# Patient Record
Sex: MALE | Race: WHITE | Employment: PART TIME | URBAN - METROPOLITAN AREA
[De-identification: names, ages, dates, MRNs, and addresses within clinical notes are randomized per-mention and may not be internally consistent; named-entity substitution may affect disease eponyms.]

---

## 2017-02-22 ENCOUNTER — APPOINTMENT (OUTPATIENT)
Dept: GENERAL RADIOLOGY | Age: 32
End: 2017-02-22
Attending: EMERGENCY MEDICINE
Payer: SUBSIDIZED

## 2017-02-22 ENCOUNTER — HOSPITAL ENCOUNTER (EMERGENCY)
Age: 32
Discharge: HOME | End: 2017-02-22
Attending: EMERGENCY MEDICINE
Payer: SUBSIDIZED

## 2017-02-22 VITALS
HEART RATE: 93 BPM | HEIGHT: 72 IN | DIASTOLIC BLOOD PRESSURE: 81 MMHG | TEMPERATURE: 98.5 F | RESPIRATION RATE: 18 BRPM | WEIGHT: 168 LBS | OXYGEN SATURATION: 98 % | BODY MASS INDEX: 22.75 KG/M2 | SYSTOLIC BLOOD PRESSURE: 127 MMHG

## 2017-02-22 DIAGNOSIS — S62.397A CLOSED NONDISPLACED FRACTURE OF OTHER PART OF FIFTH METACARPAL BONE OF LEFT HAND, INITIAL ENCOUNTER: Primary | ICD-10-CM

## 2017-02-22 PROCEDURE — 75810000053 HC SPLINT APPLICATION

## 2017-02-22 PROCEDURE — 74011250637 HC RX REV CODE- 250/637: Performed by: EMERGENCY MEDICINE

## 2017-02-22 PROCEDURE — 99283 EMERGENCY DEPT VISIT LOW MDM: CPT

## 2017-02-22 PROCEDURE — 73130 X-RAY EXAM OF HAND: CPT

## 2017-02-22 RX ORDER — NAPROXEN 500 MG/1
500 TABLET ORAL
Qty: 20 TAB | Refills: 0 | Status: SHIPPED | OUTPATIENT
Start: 2017-02-22 | End: 2017-05-07

## 2017-02-22 RX ORDER — TRAMADOL HYDROCHLORIDE 50 MG/1
50 TABLET ORAL
Qty: 12 TAB | Refills: 0 | Status: SHIPPED | OUTPATIENT
Start: 2017-02-22 | End: 2017-05-07

## 2017-02-22 RX ORDER — IBUPROFEN 600 MG/1
600 TABLET ORAL
Status: COMPLETED | OUTPATIENT
Start: 2017-02-22 | End: 2017-02-22

## 2017-02-22 RX ADMIN — IBUPROFEN 600 MG: 600 TABLET, FILM COATED ORAL at 12:54

## 2017-02-22 NOTE — DISCHARGE INSTRUCTIONS
Broken Hand: Care Instructions  Your Care Instructions  A hand can break (fracture) during sports, a fall, or other accidents. The break may happen when your hand twists, is hit, or is used to protect you in a fall. Fractures can range from a small, hairline crack, to a bone or bones broken into two or more pieces. Your treatment depends on how bad the break is. Your doctor may have put your hand in a brace, splint, or cast to allow it to heal or to keep it stable until you see another doctor. It may take weeks or months for your hand to heal. You can help it heal with some care at home. You heal best when you take good care of yourself. Eat a variety of healthy foods, and don't smoke. Follow-up care is a key part of your treatment and safety. Be sure to make and go to all appointments, and call your doctor if you are having problems. It's also a good idea to know your test results and keep a list of the medicines you take. How can you care for yourself at home? · Put ice or a cold pack on your hand for 10 to 20 minutes at a time. Try to do this every 1 to 2 hours for the next 3 days (when you are awake). Put a thin cloth between the ice and your cast or splint. Keep your cast or splint dry. · Follow the cast care instructions your doctor gives you. If you have a splint, do not take it off unless your doctor tells you to. · Be safe with medicines. Take pain medicines exactly as directed. ¨ If the doctor gave you a prescription medicine for pain, take it as prescribed. ¨ If you are not taking a prescription pain medicine, ask your doctor if you can take an over-the-counter medicine. · Prop up your hand on pillows when you sit or lie down in the first few days after the injury. Keep your hand higher than the level of your heart. This will help reduce swelling. · Follow instructions for exercises to keep your arm strong.   · Wiggle your uninjured fingers often to reduce swelling and stiffness, but do not use that hand to grasp or carry anything. When should you call for help? Call your doctor now or seek immediate medical care if:  · You have severe pain or increased pain. · Your cast or splint feels too tight. · You cannot move your fingers. · You have tingling, weakness, or numbness in your hand and fingers. · Your hand or fingers are cool or pale or change color. · You have a lot of swelling near your cast or splint. · The skin under your cast or splint is burning or stinging. Watch closely for changes in your health, and be sure to contact your doctor if:  · You do not get better as expected. Where can you learn more? Go to http://alf-nasim.info/. Enter [phone] [phone] in the search box to learn more about \"Broken Hand: Care Instructions. \"  Current as of: May 23, 2016  Content Version: 11.1  © 8512-0110 [employer]. Care instructions adapted under license by [employer] (which disclaims liability or warranty for this information). If you have questions about a medical condition or this instruction, always ask your healthcare professional. [address] any warranty or liability for your use of this information.

## 2017-02-22 NOTE — LETTER
[facility] EMERGENCY DEPT 
[address] [facility] [address] [id number] 
[phone] Work/School Note Date: 2/22/2017 To Whom It May concern: 
 
Makenna Kelly was seen and treated today in the emergency room by the following provider(s): 
Attending Provider: Marquita Acosta MD 
Physician Assistant: Valerie Garcia PA-C. Makenna Kelly may return to work on 2/22/2017. Sincerely, Valerie Garcia PA-C
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
## 2017-02-22 NOTE — LETTER
[address] 
[facility] EMERGENCY DEPT 
[address] [facility] [address] [address] 
[phone] Work/School Note Date: 2/22/2017 To Whom It May concern: 
 
Wojciech Ireland was seen and treated today in the emergency room by the following provider(s): 
Physician Assistant: Irineo Kessler PA-C. Wojciech Ireladn may return to work at [address] [place] [address] on 2/22/2017. Sincerely, Irineo Kessler PA-C

## 2017-05-07 ENCOUNTER — HOSPITAL ENCOUNTER (EMERGENCY)
Age: 32
Discharge: HOME | End: 2017-05-07
Attending: INTERNAL MEDICINE | Admitting: INTERNAL MEDICINE
Payer: SUBSIDIZED

## 2017-05-07 VITALS
HEART RATE: 75 BPM | BODY MASS INDEX: 22.35 KG/M2 | TEMPERATURE: 98.1 F | SYSTOLIC BLOOD PRESSURE: 118 MMHG | HEIGHT: 72 IN | RESPIRATION RATE: 19 BRPM | DIASTOLIC BLOOD PRESSURE: 70 MMHG | WEIGHT: 165 LBS

## 2017-05-07 DIAGNOSIS — H72.92 RUPTURED EAR DRUM, LEFT: ICD-10-CM

## 2017-05-07 DIAGNOSIS — H61.23 BILATERAL IMPACTED CERUMEN: Primary | ICD-10-CM

## 2017-05-07 PROCEDURE — 99282 EMERGENCY DEPT VISIT SF MDM: CPT

## 2017-05-07 PROCEDURE — 76010010392 HC REMOVAL IMPACTED WAX IRRIGATION/LVG UNI

## 2017-05-07 PROCEDURE — 99283 EMERGENCY DEPT VISIT LOW MDM: CPT

## 2017-05-07 RX ORDER — AMOXICILLIN 875 MG/1
875 TABLET, FILM COATED ORAL 2 TIMES DAILY
Qty: 14 TAB | Refills: 0 | Status: SHIPPED | OUTPATIENT
Start: 2017-05-07 | End: 2017-05-14

## 2017-05-07 NOTE — ED NOTES
Patient (s)  given copy of dc instructions and 2 script(s). Patient (s)  verbalized understanding of instructions and script (s). Patient given a current medication reconciliation form and verbalized understanding of their medications. Patient (s) verbalized understanding of the importance of discussing medications with  his or her physician or clinic they will be following up with. Patient alert and oriented and in no acute distress. Patient discharged home ambulatory.   Patient declined wheelchair at discharge

## 2017-05-07 NOTE — ED NOTES
MD came in and looked at patients ear. Bleeding has stopped. Patient was given cotton balls to place in left ear to protect it from infection. Right ear lightly irrigated- little debris came out.

## 2017-05-07 NOTE — ED NOTES
Emergency Department Nursing Plan of Care       The Nursing Plan of Care is developed from the Nursing assessment and Emergency Department Attending provider initial evaluation. The plan of care may be reviewed in the ED Provider note.     The Plan of Care was developed with the following considerations:   Patient / Family readiness to learn indicated by:verbalized understanding  Persons(s) to be included in education: patient  Barriers to Learning/Limitations:No    Signed     Ethan Blanc RN    5/7/2017   9:34 AM

## 2017-05-07 NOTE — ED NOTES
I irrigated the patients left ear and multiple large pieces of white, gray and yellow matter came out. I reported it to the MD and had him come in the room and look at the debris and look in the patients ear. He felt additional irrigation was needed so I did more and then the patient reported pain and his ear was bleeding. I notified the provider.

## 2017-05-07 NOTE — ED PROVIDER NOTES
HPI Comments: Ayla tSern is a 28 y.o. Male who presents ambulatory to the ED c/o acute onset of L ear pain since earlier this morning. Pt notes that he has not been able to hear properly from his L ear. Pt notes that he was given ear medicine for a previous episode of ear pain, and he has attempted to use this medicine again without relief. Pt denies any longstanding diseases. Pt specifically denies fever, cough, congestion, nausea, vomiting, or diarrhea. Social hx: + Tobacco use (0.75 ppd), - EtOH use, - Illicit drug use    PCP: None    There are no other complaints, changes or physical findings at this time. The history is provided by the patient. No  was used. History reviewed. No pertinent past medical history. Past Surgical History:   Procedure Laterality Date    HX WRIST FRACTURE TX Right          History reviewed. No pertinent family history. Social History     Social History    Marital status: SINGLE     Spouse name: N/A    Number of children: N/A    Years of education: N/A     Occupational History    Not on file. Social History Main Topics    Smoking status: Current Every Day Smoker     Packs/day: 0.75    Smokeless tobacco: Not on file    Alcohol use No    Drug use: No    Sexual activity: Yes     Partners: Male     Other Topics Concern    Not on file     Social History Narrative         ALLERGIES: Review of patient's allergies indicates no known allergies. Review of Systems   Constitutional: Negative for chills, fatigue and fever. HENT: Positive for ear pain (left). Negative for congestion and rhinorrhea.         + L-sided hearing decreased   Eyes: Negative for visual disturbance. Respiratory: Negative for cough, shortness of breath and wheezing. Cardiovascular: Negative for chest pain and palpitations. Gastrointestinal: Negative for abdominal distention, abdominal pain, constipation, diarrhea, nausea and vomiting. Endocrine: Negative. Genitourinary: Negative for difficulty urinating and dysuria. Musculoskeletal: Negative. Skin: Negative for rash. Neurological: Negative for dizziness, weakness and light-headedness. Psychiatric/Behavioral: Negative for suicidal ideas. Vitals:    05/07/17 0926   BP: 118/70   Pulse: 75   Resp: 19   Temp: 98.1 °F (36.7 °C)   Weight: 74.8 kg (165 lb)   Height: 6' (1.829 m)            Physical Exam   Constitutional: He is oriented to person, place, and time. He appears well-developed and well-nourished. HENT:   Head: Normocephalic and atraumatic. Collection of dey old cerumen in K ear, ear canal not visible BL   Eyes: EOM are normal.   Cardiovascular: Normal rate, regular rhythm and normal heart sounds. Exam reveals no gallop and no friction rub. No murmur heard. Pulmonary/Chest: Effort normal and breath sounds normal. No respiratory distress. He has no wheezes. He has no rales. Abdominal: Soft. Bowel sounds are normal. He exhibits no distension. There is no tenderness. There is no rebound and no guarding. Neurological: He is alert and oriented to person, place, and time. He has normal strength. No sensory deficit. He displays a negative Romberg sign. Gait normal.   Skin: No ecchymosis and no lesion noted. Rash is not urticarial. He is not diaphoretic. Psychiatric: He has a normal mood and affect. Nursing note and vitals reviewed. MDM  Number of Diagnoses or Management Options  Diagnosis management comments: DDx: cerumen impaction, Otitis Externa, Otitis Media       Amount and/or Complexity of Data Reviewed  Review and summarize past medical records: yes    Patient Progress  Patient progress: stable    ED Course     Procedures   Progress Note:  10:21 AM  Pt was re-evaluated after his L ear began bleeding following flushing with hydrogen peroxide and subsequent removal of compacted cerumen. Pt was instructed to follow-up in 4-5 days for re-evaluation.    Written by Stephanie Kim, ED Scribe, as dictated by Eulogio Beauchamp MD.    IMPRESSION:  1. Bilateral impacted cerumen        PLAN:  1. Current Discharge Medication List      START taking these medications    Details   neomycin-colist-hydrocortisone-thonzonium (CORTISPORIN-TC) otic suspension Administer 2 Drops into each ear four (4) times daily. Qty: 5 mL, Refills: 0           2. Follow-up Information     Follow up With Details Comments Contact Info    [facility] EMERGENCY DEPT   Glendy Hagen        Return to ED if worse     DISCHARGE NOTE  10:25 AM  The patient has been re-evaluated and is ready for discharge. Reviewed available results with patient. Counseled pt on diagnosis and care plan. Pt has expressed understanding, and all questions have been answered. Pt agrees with plan and agrees to F/U as recommended, or return to the ED if their sxs worsen. Discharge instructions have been provided and explained to the pt, along with reasons to return to the ED. This note is prepared by Mary Lockett, acting as Scribe for Eulogio Beauchamp MD.    Eulogio Beauchamp MD: The scribe's documentation has been prepared under my direction and personally reviewed by me in its entirety. I confirm that the note above accurately reflects all work, treatment, procedures, and medical decision making performed by me.

## 2017-05-07 NOTE — DISCHARGE INSTRUCTIONS
Earwax Blockage: Care Instructions  Your Care Instructions    Earwax is a natural substance that protects the ear canal. Normally, earwax drains from the ears and does not cause problems. Sometimes earwax builds up and hardens. Earwax blockage (also called cerumen impaction) can cause some loss of hearing and pain. When wax is tightly packed, you will need to have your doctor remove it. Follow-up care is a key part of your treatment and safety. Be sure to make and go to all appointments, and call your doctor if you are having problems. Its also a good idea to know your test results and keep a list of the medicines you take. How can you care for yourself at home? · Do not try to remove earwax with cotton swabs, fingers, or other objects. This can make the blockage worse and damage the eardrum. · If your doctor recommends that you try to remove earwax at home:  ¨ Soften and loosen the earwax with warm mineral oil. You also can try hydrogen peroxide mixed with an equal amount of room temperature water. Place 2 drops of the fluid, warmed to body temperature, in the ear two times a day for up to 5 days. ¨ Once the wax is loose and soft, all that is usually needed to remove it from the ear canal is a gentle, warm shower. Direct the water into the ear, then tip your head to let the earwax drain out. Dry your ear thoroughly with a hair dryer set on low. Hold the dryer several inches from your ear. ¨ If the warm mineral oil and shower do not work, use an over-the-counter wax softener followed by gentle flushing with an ear syringe each night for a week or two. Make sure the flushing solution is body temperature. Cool or hot fluids in the ear can cause dizziness. When should you call for help? Call your doctor now or seek immediate medical care if:  · Pus or blood drains from your ear. · Your ears are ringing or feel full. · You have a loss of hearing.   Watch closely for changes in your health, and be sure to contact your doctor if:  · You have pain or reduced hearing after 1 week of home treatment. · You have any new symptoms, such as nausea or balance problems. Where can you learn more? Go to http://alf-nasim.info/. Enter E462 in the search box to learn more about \"Earwax Blockage: Care Instructions. \"  Current as of: May 27, 2016  Content Version: 11.2  © 7198-7112 [employer]. Care instructions adapted under license by [employer] (which disclaims liability or warranty for this information). If you have questions about a medical condition or this instruction, always ask your healthcare professional. [address] any warranty or liability for your use of this information.